# Patient Record
Sex: MALE
[De-identification: names, ages, dates, MRNs, and addresses within clinical notes are randomized per-mention and may not be internally consistent; named-entity substitution may affect disease eponyms.]

---

## 2018-05-02 ENCOUNTER — HOSPITAL ENCOUNTER (EMERGENCY)
Dept: HOSPITAL 42 - ED | Age: 53
Discharge: HOME | End: 2018-05-02
Payer: COMMERCIAL

## 2018-05-02 VITALS — BODY MASS INDEX: 33.5 KG/M2

## 2018-05-02 DIAGNOSIS — S61.211A: Primary | ICD-10-CM
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
## 2018-05-02 DIAGNOSIS — S61.213A: ICD-10-CM

## 2018-05-02 DIAGNOSIS — Z23: ICD-10-CM

## 2018-05-02 NOTE — ED PDOC
Arrival/HPI





- General


Chief Complaint: Abnormal Skin Integrity


Time Seen by Provider: 05/02/18 12:56


Historian: Patient





- History of Present Illness


Narrative History of Present Illness (Text): 


05/02/18 12:57


A 52 year old male presents to the emergency department for evaluation of 

lacerations to left index finger and left middle finger. Patient reports 

lacerations from using a circular saw, was cutting wood. Patient is right hand 

dominant. Notes slight numbness to tip of fingers. Tetanus shot greater than 5 

years. No active bleeding. Patient denies any other complaints at this time.


Symptom Onset: Sudden


Symptom Course: Unchanged


Activities at Onset: Significant


Associated Symptoms (Text): 


none





Past Medical History





- Provider Review


Nursing Documentation Reviewed: Yes





- Psychiatric


Hx Substance Use: No





Family/Social History





- Physician Review


Nursing Documentation Reviewed: Yes


Family/Social History: No Known Family HX


Smoking Status: Never Smoked


Hx Alcohol Use: Yes


Frequency of alcohol use: Socially


Hx Substance Use: No





Allergies/Home Meds


Allergies/Adverse Reactions: 


Allergies





No Known Allergies Allergy (Verified 05/02/18 12:24)


 











Review of Systems





- Physician Review


All systems were reviewed & negative as marked: Yes





- Review of Systems


Constitutional: absent: Fevers


Skin: Laceration (left index finger, left middle finger; slight numbness to tip 

of fingers)





Physical Exam


Vital Signs Reviewed: Yes


Appearance: Positive for: Well-Appearing, Non-Toxic, Comfortable


Pain Distress: None


Mental Status: Positive for: Alert and Oriented X 3





- Systems Exam


Head: Present: Atraumatic, Normocephalic


Pupils: Present: PERRL


Extroacular Muscles: Present: EOMI


Conjunctiva: Present: Normal


Mouth: Present: Moist Mucous Membranes


Neck: Present: Normal Range of Motion


Respiratory/Chest: Present: Clear to Auscultation, Good Air Exchange.  No: 

Respiratory Distress, Accessory Muscle Use


Cardiovascular: Present: Regular Rate and Rhythm, Normal S1, S2.  No: Murmurs


Abdomen: No: Tenderness, Distention, Peritoneal Signs


Back: Present: Normal Inspection


Upper Extremity: Present: NORMAL PULSES, Other (complex laceration to palmar 

aspect to digits; full ROM; some evulsion noted to index finger at tip of 

laceration; good capillary refill)


Lower Extremity: Present: Normal Inspection.  No: Edema


Neurological: Present: GCS=15, CN II-XII Intact, Speech Normal


Skin: Present: Warm, Dry, Normal Color.  No: Rashes


Psychiatric: Present: Alert, Oriented x 3, Normal Insight, Normal Concentration





Medical Decision Making


ED Course and Treatment: 


05/02/18 12:57


Impression:


A 52 year old male with left index finger and left middle finger lacerations.





Plan:


-- Radiology left hand


-- Boostrix


-- Reassess and disposition





Progress Notes:


05/02/18 13:28


Left Hand Radiographs


Creator : Eulogio Jackson MD


IMPRESSION: Normal left hand radiographs.





PROCEDURE: LACERATION REPAIR


Performed by the emergency provider


Location: left index and left middle fingers; digital


Description: clean wound edges, no foreign bodies


Distal CMS: Normal. No deficits. Neurovascularly intact.


Anesthesia: Lidocaine 1%


Preparation: The wound was cleaned with NS and Betadyne. The area was prepped 

and draped in


the usual sterile fashion.


Exploration: The wound was explored and no foreign bodies were found.


Procedure: The wound was closed with 4-0 nylon. There was good / appropriate / 

adequate /


loose approximation. In total, 10 sutures for left index finger and 4 sutures 

for left middle finger were used.


Post-Procedure: Good closure and hemostasis. The patient tolerated the 

procedure well and there


were no complications. CSM remains intact. Post procedure dressing applied.








- RAD Interpretation


Radiology Orders: 








05/02/18 12:57


HAND LEFT 3 VIEWS ROUTINE [RAD] Stat 














- Medication Orders


Current Medication Orders: 











Discontinued Medications





Acetaminophen (Tylenol 325mg Tab)  650 mg PO STAT STA


   Stop: 05/02/18 15:25


   Last Admin: 05/02/18 15:32  Dose: 650 mg





MAR Pain/Vitals


 Document     05/02/18 15:32  HI  (Rec: 05/02/18 15:32  HI  [id number])


     Pain Reassessment


      Is This A Pain ReAssessment?               No


     Sleep


      Is patient sleeping during reassessment?   No


     Presence of Pain


      Presence of Pain                           Yes


     Pain Scale Used


      Pain Scale Used                            Numeric


     Location


      Left, Right or Bilateral                   Left


      Pain Location Body Site                    Finger


      Description                                Throbbing





Amoxicillin/Clavulanate Potassium (Augmentin 875 Mg-125 Mg Tab)  1 tab PO STAT 

STA


   PRN Reason: Protocol


   Stop: 05/02/18 15:25


   Last Admin: 05/02/18 15:32  Dose: 1 tab





Tetanus/Reduced Diphtheria/Acell Pertussis (Boostrix Vaccine Inj)  0.5 ml IM 

.ONCE ONE


   Stop: 05/02/18 13:04


   Last Admin: 05/02/18 14:23  Dose: 0.5 ml





Immunization Registry


 Document     05/02/18 14:23  HI  (Rec: 05/02/18 14:23  HI  [id number])


      Immunization Registry Consent Date         05/02/18














- PA / NP / Resident Statement


MD/ has reviewed & agrees with the documentation as recorded.


MD/DO has examined the patient and agrees with the treatment plan.





- Scribe Statement


The provider has reviewed the documentation as recorded by the Giuseppe Lamb





Provider Scribe Attestation:


All medical record entries made by the Scribnell were at my direction and 

personally dictated by me. I have reviewed the chart and agree that the record 

accurately reflects my personal performance of the history, physical exam, 

medical decision making, and the department course for this patient. I have 

also personally directed, reviewed, and agree with the discharge instructions 

and disposition.











Disposition/Present on Arrival





- Present on Arrival


Any Indicators Present on Arrival: No


History of DVT/PE: No


History of Uncontrolled Diabetes: No


Urinary Catheter: No


History of Decub. Ulcer: No


History Surgical Site Infection Following: None





- Disposition


Have Diagnosis and Disposition been Completed?: Yes


Diagnosis: 


 Laceration of fingers without complication





Disposition: HOME/ ROUTINE


Disposition Time: 14:00


Condition: IMPROVED


Discharge Instructions (ExitCare):  Laceration Repair With Stitches (DC)


Additional Instructions: 





Thank you for letting us take care of you today. The emergency medical care you 

received today was directed at your acute symptoms. If you were prescribed any 

medication, please fill it and take as directed. It may take several days for 

your symptoms to resolve. Return to the Emergency Department if your symptoms 

worsen, do not improve, or if you have any other problems.





Please contact your doctor or call one of the physicians/clinics you have been 

referred to that are listed on the Patient Visit Information form that is 

included in your discharge packet. Bring any paperwork you were given at 

discharge with you along with any medications you are taking to your follow up 

visit. Our treatment cannot replace ongoing medical care by a primary care 

provider (PCP) outside of the emergency department.





Thank you for allowing the [facility] team to be part of your care today.

















Follow up with Dr. Lou, the hand doctor, or your primary doctor in 2 days for 

re-evaluation and wound check.


Prescriptions: 


Amoxicillin/Clavulanate [Augmentin 875 MG-125 MG] 1 tab PO BID #20 tab


Referrals: 


PCP,NO [Primary Care Provider] - Follow up with primary


Nellie Lou MD [Non-Staff] - Follow up with primary


Forms:  [employer] (English), WORK NOTE

## 2018-05-02 NOTE — RAD
PROCEDURE:  Left Hand Radiographs.



HISTORY:

lacerations to 2nd and 3rd digits  



COMPARISON:

None.



FINDINGS:



BONES:

Normal. No fracture. 



JOINTS:

Normal. No osteoarthritic changes. 



SOFT TISSUES:

Normal. 



OTHER FINDINGS:

None.



IMPRESSION:

Normal left hand radiographs.

## 2018-05-03 VITALS — OXYGEN SATURATION: 100 % | RESPIRATION RATE: 18 BRPM

## 2018-05-03 VITALS — SYSTOLIC BLOOD PRESSURE: 147 MMHG | DIASTOLIC BLOOD PRESSURE: 85 MMHG | HEART RATE: 67 BPM
